# Patient Record
Sex: MALE | Race: WHITE | NOT HISPANIC OR LATINO
[De-identification: names, ages, dates, MRNs, and addresses within clinical notes are randomized per-mention and may not be internally consistent; named-entity substitution may affect disease eponyms.]

---

## 2021-07-19 PROBLEM — Z00.00 ENCOUNTER FOR PREVENTIVE HEALTH EXAMINATION: Status: ACTIVE | Noted: 2021-07-19

## 2021-07-21 ENCOUNTER — APPOINTMENT (OUTPATIENT)
Dept: UROLOGY | Facility: CLINIC | Age: 71
End: 2021-07-21
Payer: MEDICARE

## 2021-07-21 VITALS
HEIGHT: 74 IN | WEIGHT: 200 LBS | BODY MASS INDEX: 25.67 KG/M2 | SYSTOLIC BLOOD PRESSURE: 132 MMHG | TEMPERATURE: 96.5 F | DIASTOLIC BLOOD PRESSURE: 77 MMHG | HEART RATE: 67 BPM

## 2021-07-21 DIAGNOSIS — Z80.49 FAMILY HISTORY OF MALIGNANT NEOPLASM OF OTHER GENITAL ORGANS: ICD-10-CM

## 2021-07-21 DIAGNOSIS — Z80.0 FAMILY HISTORY OF MALIGNANT NEOPLASM OF DIGESTIVE ORGANS: ICD-10-CM

## 2021-07-21 DIAGNOSIS — Z78.9 OTHER SPECIFIED HEALTH STATUS: ICD-10-CM

## 2021-07-21 PROCEDURE — 99204 OFFICE O/P NEW MOD 45 MIN: CPT

## 2021-07-21 RX ORDER — RAMIPRIL 10 MG/1
10 CAPSULE ORAL
Refills: 0 | Status: ACTIVE | COMMUNITY

## 2021-07-21 RX ORDER — AMLODIPINE BESYLATE 2.5 MG/1
2.5 TABLET ORAL
Refills: 0 | Status: ACTIVE | COMMUNITY

## 2021-07-21 RX ORDER — METOPROLOL TARTRATE 25 MG/1
25 TABLET, FILM COATED ORAL
Refills: 0 | Status: ACTIVE | COMMUNITY

## 2021-07-21 RX ORDER — RIVAROXABAN 20 MG/1
20 TABLET, FILM COATED ORAL
Refills: 0 | Status: ACTIVE | COMMUNITY

## 2021-07-21 RX ORDER — SIMVASTATIN 10 MG/1
10 TABLET, FILM COATED ORAL
Refills: 0 | Status: ACTIVE | COMMUNITY

## 2021-08-12 ENCOUNTER — NON-APPOINTMENT (OUTPATIENT)
Age: 71
End: 2021-08-12

## 2021-08-24 ENCOUNTER — APPOINTMENT (OUTPATIENT)
Dept: UROLOGY | Facility: CLINIC | Age: 71
End: 2021-08-24
Payer: MEDICARE

## 2021-08-24 ENCOUNTER — OUTPATIENT (OUTPATIENT)
Dept: OUTPATIENT SERVICES | Facility: HOSPITAL | Age: 71
LOS: 1 days | End: 2021-08-24
Payer: MEDICARE

## 2021-08-24 VITALS
RESPIRATION RATE: 16 BRPM | SYSTOLIC BLOOD PRESSURE: 112 MMHG | HEART RATE: 71 BPM | DIASTOLIC BLOOD PRESSURE: 72 MMHG | TEMPERATURE: 97 F

## 2021-08-24 DIAGNOSIS — R93.5 ABNORMAL FINDINGS ON DIAGNOSTIC IMAGING OF OTHER ABDOMINAL REGIONS, INCLUDING RETROPERITONEUM: ICD-10-CM

## 2021-08-24 DIAGNOSIS — R97.20 ELEVATED PROSTATE, SPECIFIC ANTIGEN [PSA]: ICD-10-CM

## 2021-08-24 DIAGNOSIS — R35.0 FREQUENCY OF MICTURITION: ICD-10-CM

## 2021-08-24 PROCEDURE — 55700: CPT

## 2021-08-24 PROCEDURE — 76872 US TRANSRECTAL: CPT

## 2021-08-24 PROCEDURE — 76377 3D RENDER W/INTRP POSTPROCES: CPT | Mod: 26

## 2021-08-24 PROCEDURE — 76942 ECHO GUIDE FOR BIOPSY: CPT | Mod: 26,59

## 2021-08-24 PROCEDURE — 76872 US TRANSRECTAL: CPT | Mod: 26

## 2021-08-24 PROCEDURE — 55700: CPT | Mod: 22

## 2021-08-24 PROCEDURE — 76942 ECHO GUIDE FOR BIOPSY: CPT | Mod: 59

## 2021-08-25 ENCOUNTER — NON-APPOINTMENT (OUTPATIENT)
Age: 71
End: 2021-08-25

## 2021-08-26 ENCOUNTER — TRANSCRIPTION ENCOUNTER (OUTPATIENT)
Age: 71
End: 2021-08-26

## 2021-08-27 ENCOUNTER — TRANSCRIPTION ENCOUNTER (OUTPATIENT)
Age: 71
End: 2021-08-27

## 2021-08-31 ENCOUNTER — APPOINTMENT (OUTPATIENT)
Dept: UROLOGY | Facility: CLINIC | Age: 71
End: 2021-08-31
Payer: MEDICARE

## 2021-08-31 LAB — CORE LAB BIOPSY: NORMAL

## 2021-08-31 PROCEDURE — 99214 OFFICE O/P EST MOD 30 MIN: CPT | Mod: 95

## 2021-08-31 RX ORDER — DIAZEPAM 5 MG/1
5 TABLET ORAL
Qty: 1 | Refills: 0 | Status: DISCONTINUED | COMMUNITY
Start: 2021-08-13 | End: 2021-08-31

## 2021-08-31 NOTE — END OF VISIT
[Time Spent: ___ minutes] : I have spent [unfilled] minutes of time on the encounter. not appropriate for swallowing intervention

## 2021-08-31 NOTE — LETTER BODY
[FreeTextEntry1] : Bola Collins MD\par [address] [facility]\[place] [address]\par \par Dear Dr. Collins,\par \par John Liu returns today for telemedicine follow-up. He is a 71-year-old man recently status post prostate biopsy. He is with me today by telemedicine for review of these biopsy results. He feels well since the biopsy procedure and denies post biopsy fevers or chills. He has not experienced any difficulty with urination after the procedure.\par \par The biopsy findings have shown Anup 4+3 adenocarcinoma of the prostate. Cancer is detected primarily on the right side of the prostate in both the MRI target lesion as well as several other template biopsies from the overlapping areas of the prostate. Up to 100% core length was involved.\par \par The previously performed MRI of the prostate was also reviewed today demonstrating no evidence of disease outside of the prostate. The neurovascular bundles, lymph nodes, bladder, seminal vesicles, and osseous structures within the pelvis are all within normal limits and do not show any evidence of involvement.\par \par \par In summary, this is a 71-year-old otherwise healthy man with unfavorable intermediate risk prostate cancer. In this context we discussed potential treatment options to consider.\par \par I had a discussion today in the office with the patient regarding potential treatment options for his newly diagnosed prostate cancer. We discussed observation versus radiation therapy versus surgery vs focal therapy.\par \par With regard to active surveillance, I would recommend strongly against this given the clinical significance of his disease detected in his otherwise good health.\par \par With regard to radiation therapy, we discussed the different modalities to deliver the radiation in general. I explained that both seeds and external beam radiation therapy or a combination of the 2 would be options to discuss with the radiation oncologist should he seek their consultation. We also reviewed the potential risks and side effects of radiation therapy including development or worsening lower urinary tract symptoms including urgency and frequency in particular. I also explained to the patient that similar symptoms can occur with bowel movements. The relatively low but increased risk of secondary malignancy was also reviewed for both the bladder and the rectum.  The risk of developing urethral stricture or urinary incontinence was also discussed, and the potential for erectile dysfunction was also reviewed. I also explained to the patient that hormonal therapy may be required in the neoadjuvant fashion prior to initiation of radiation treatment as the outcomes are generally superior and using this approach versus radiation therapy alone. We discussed potential side effects of the hormonal therapy including hot flashes, loss of libido, erectile dysfunction, fatigue, loss of muscle mass, and loss of bone density.\par \par With regard to surgical treatment, we discussed risks and benefits of both open and robotic assisted laparoscopic techniques. I explained that both procedures are performed with the goal of removing the prostate and seminal vesicles in their entirety and that often pelvic lymph node dissection was also performed for the purposes of staging if indicated. I also advised the patient that he would need to avoid any blood thinning medication for one week ahead of time including, but not limited to, Plavix and Coumadin. I discussed the expected hospital course with the patient including a 1-2 night hospital stay and the need for Degroot catheter in place after the procedure. I explained that this would typically remain in place for approximately 7 days. We also reviewed the expected postoperative recovery. Which I informed him would be most bothersome initially with a Degroot catheter and that his energy level will be down for between 3-4 weeks after surgery. I advised him that he would not able to be doing any heavy lifting for 6 weeks following the procedure. We also discussed potential risks and complications of surgery including postoperative bleeding, injury to another intra-abdominal structure, and infection. Significant time was also dedicated to reviewing the potential side effects of surgery including the possibility of urinary incontinence and erectile dysfunction. I reviewed with the patient that almost all patients are initially incontinent and unable to achieve erections, but over time these functions can return to the pre-operative level. I have explained that the urinary control is far more likely to return than erectile function.  We reviewed the importance of performance of kegel exercises after surgery to help regain continence.  I also reviewed several strategies for penile rehabilitation.\par \par With regard to focal therapy of the prostate, we discussed the potential for hemigland ablation. I do think his prostate cancer is somewhat borderline for focal therapy given the volume of disease present but a hemigland ablation could potentially be considered understanding that they would not offer as high likelihood of disease cure but that it could offer him much less significant side effect profile. I do think it is potentially viable to consider here, but I would favor radical prostatectomy or radiation over focal therapy here.\par \par I explained followup strategy for both types of treatments including performance serial PSAs as well as rectal examination.\par \par The patient communicates his understanding of these options as outlined. He is strongly in favor of surgery and would like to move forward with scheduling robotic assisted radical prostatectomy and pelvic lymph node dissection. I have advised him to see you for clearance.\par \par Please do not hesitate to contact me with any questions or concerns.\par \par Sincerely,\par \par \par \par \par John Shields MD, FACS\par  of Urology\par Residency \par [facility]\par \par [facility]\par Director of Robotics and Minimally Invasive Surgery\par [address] [place] [address]\[place] [address]\par P: [phone]\par F: [phone]\par [email or web address]

## 2021-08-31 NOTE — PHYSICAL EXAM
[General Appearance - In No Acute Distress] : no acute distress [] : no respiratory distress [Oriented To Time, Place, And Person] : oriented to person, place, and time [Affect] : the affect was normal [Mood] : the mood was normal [Not Anxious] : not anxious

## 2021-08-31 NOTE — HISTORY OF PRESENT ILLNESS
[Home] : at home, [unfilled] , at the time of the visit. [Medical Office: ([facility])___] : at the medical office located in  [FreeTextEntry1] : See consult letter

## 2021-09-01 ENCOUNTER — TRANSCRIPTION ENCOUNTER (OUTPATIENT)
Age: 71
End: 2021-09-01

## 2021-09-08 DIAGNOSIS — R97.20 ELEVATED PROSTATE SPECIFIC ANTIGEN [PSA]: ICD-10-CM

## 2021-09-08 DIAGNOSIS — R93.5 ABNORMAL FINDINGS ON DIAGNOSTIC IMAGING OF OTHER ABDOMINAL REGIONS, INCLUDING RETROPERITONEUM: ICD-10-CM

## 2021-09-22 ENCOUNTER — TRANSCRIPTION ENCOUNTER (OUTPATIENT)
Age: 71
End: 2021-09-22

## 2021-09-24 ENCOUNTER — NON-APPOINTMENT (OUTPATIENT)
Age: 71
End: 2021-09-24

## 2021-09-24 DIAGNOSIS — Z87.442 PERSONAL HISTORY OF URINARY CALCULI: ICD-10-CM

## 2021-09-24 DIAGNOSIS — R30.0 DYSURIA: ICD-10-CM

## 2021-09-27 ENCOUNTER — TRANSCRIPTION ENCOUNTER (OUTPATIENT)
Age: 71
End: 2021-09-27

## 2021-09-28 ENCOUNTER — TRANSCRIPTION ENCOUNTER (OUTPATIENT)
Age: 71
End: 2021-09-28

## 2021-09-29 ENCOUNTER — OUTPATIENT (OUTPATIENT)
Dept: OUTPATIENT SERVICES | Facility: HOSPITAL | Age: 71
LOS: 1 days | End: 2021-09-29
Payer: MEDICARE

## 2021-09-29 ENCOUNTER — TRANSCRIPTION ENCOUNTER (OUTPATIENT)
Age: 71
End: 2021-09-29

## 2021-09-29 VITALS
WEIGHT: 199.08 LBS | DIASTOLIC BLOOD PRESSURE: 92 MMHG | HEART RATE: 67 BPM | TEMPERATURE: 98 F | SYSTOLIC BLOOD PRESSURE: 134 MMHG | RESPIRATION RATE: 16 BRPM | OXYGEN SATURATION: 97 % | HEIGHT: 73.5 IN

## 2021-09-29 DIAGNOSIS — Z90.49 ACQUIRED ABSENCE OF OTHER SPECIFIED PARTS OF DIGESTIVE TRACT: Chronic | ICD-10-CM

## 2021-09-29 DIAGNOSIS — Z98.890 OTHER SPECIFIED POSTPROCEDURAL STATES: Chronic | ICD-10-CM

## 2021-09-29 DIAGNOSIS — I10 ESSENTIAL (PRIMARY) HYPERTENSION: ICD-10-CM

## 2021-09-29 DIAGNOSIS — C61 MALIGNANT NEOPLASM OF PROSTATE: ICD-10-CM

## 2021-09-29 DIAGNOSIS — Z90.89 ACQUIRED ABSENCE OF OTHER ORGANS: Chronic | ICD-10-CM

## 2021-09-29 DIAGNOSIS — I48.91 UNSPECIFIED ATRIAL FIBRILLATION: ICD-10-CM

## 2021-09-29 DIAGNOSIS — Z87.09 PERSONAL HISTORY OF OTHER DISEASES OF THE RESPIRATORY SYSTEM: ICD-10-CM

## 2021-09-29 LAB
BLD GP AB SCN SERPL QL: NEGATIVE — SIGNIFICANT CHANGE UP
HCT VFR BLD CALC: 46.4 % — SIGNIFICANT CHANGE UP (ref 39–50)
HGB BLD-MCNC: 15.8 G/DL — SIGNIFICANT CHANGE UP (ref 13–17)
MCHC RBC-ENTMCNC: 29.9 PG — SIGNIFICANT CHANGE UP (ref 27–34)
MCHC RBC-ENTMCNC: 34.1 GM/DL — SIGNIFICANT CHANGE UP (ref 32–36)
MCV RBC AUTO: 87.7 FL — SIGNIFICANT CHANGE UP (ref 80–100)
NRBC # BLD: 0 /100 WBCS — SIGNIFICANT CHANGE UP
NRBC # FLD: 0 K/UL — SIGNIFICANT CHANGE UP
PLATELET # BLD AUTO: 220 K/UL — SIGNIFICANT CHANGE UP (ref 150–400)
RBC # BLD: 5.29 M/UL — SIGNIFICANT CHANGE UP (ref 4.2–5.8)
RBC # FLD: 12.5 % — SIGNIFICANT CHANGE UP (ref 10.3–14.5)
RH IG SCN BLD-IMP: POSITIVE — SIGNIFICANT CHANGE UP
WBC # BLD: 7.57 K/UL — SIGNIFICANT CHANGE UP (ref 3.8–10.5)
WBC # FLD AUTO: 7.57 K/UL — SIGNIFICANT CHANGE UP (ref 3.8–10.5)

## 2021-09-29 PROCEDURE — 93010 ELECTROCARDIOGRAM REPORT: CPT

## 2021-09-29 RX ORDER — SODIUM CHLORIDE 9 MG/ML
3 INJECTION INTRAMUSCULAR; INTRAVENOUS; SUBCUTANEOUS EVERY 8 HOURS
Refills: 0 | Status: DISCONTINUED | OUTPATIENT
Start: 2021-10-11 | End: 2021-10-12

## 2021-09-29 NOTE — H&P PST ADULT - NSICDXFAMILYHX_GEN_ALL_CORE_FT
FAMILY HISTORY:  Father  Still living? Unknown  FH: stroke, Age at diagnosis: Age Unknown    Mother  Still living? Unknown  FH: colon cancer, Age at diagnosis: Age Unknown

## 2021-09-29 NOTE — H&P PST ADULT - NSICDXPASTMEDICALHX_GEN_ALL_CORE_FT
PAST MEDICAL HISTORY:  Asthma     Atrial fibrillation     HTN (hypertension)     Hyperlipidemia     Kidney stone     Malignant neoplasm of prostate     Skin cancer (melanoma)     Skin cancer, basal cell     Sleep apnea could not tolerate CPAP    Squamous cell skin cancer      PAST MEDICAL HISTORY:  Asthma     Atrial fibrillation on xarelto    History of left bundle branch block (LBBB)     HTN (hypertension)     Hyperlipidemia     Kidney stone     Malignant neoplasm of prostate     Skin cancer (melanoma)     Skin cancer, basal cell     Sleep apnea could not tolerate CPAP    Squamous cell skin cancer

## 2021-09-29 NOTE — H&P PST ADULT - NSICDXPASTSURGICALHX_GEN_ALL_CORE_FT
PAST SURGICAL HISTORY:  H/O arthroscopy of knee left    H/O lithotripsy     H/O prostate biopsy     History of loop recorder pt states battery  and it is no longer functional    History of prior ablation treatment 2019    History of surgery on arm ORIF on left arm for radial fracture - age 22    S/P appendectomy     S/P cholecystectomy     S/P inguinal hernia repair     S/P tonsillectomy     Status post Mohs surgery

## 2021-09-29 NOTE — H&P PST ADULT - HISTORY OF PRESENT ILLNESS
71 year old male with increasing PSA, had MRI in 7/2021 and  biopsy in 8/2021 which revealed malignancy. Pt also with hx of kidney stones. Pt reports having charlotte hematuria last week which pt states resolved after stopping his Xarelto. Pt states he resumed his Xarelto after the blood cleared up 2 days later but then the hematuria started again so pt stopped his Xarelto again and has not resumed yet.  71 year old male with hx of asthma, afib (on xarelto), LBBB, sleep apnea, HTN, skin cancer with c/o increasing PSA, had MRI in 7/2021 and  biopsy in 8/2021 which revealed malignancy. Pt also with hx of kidney stones. Pt reports having charlotte hematuria last week which pt states resolved after stopping his Xarelto. Pt states he resumed his Xarelto after the blood cleared up 2 days later but then the hematuria started again so pt stopped his Xarelto again and has not resumed yet. Pt presents today for presurgical evaluation for Robotic Radical Prostatectomy Pelvic Lymph Node Dissection.

## 2021-09-29 NOTE — H&P PST ADULT - PROBLEM SELECTOR PLAN 1
Pt scheduled for surgery on 10/11/2021.  Pre-op instructions provided. Pt verbalized understanding.   Pepcid provided for GI prophylaxis.   Pt given detailed verbal and written instructions on chlorhexidine wash. Pt verbalized understanding with teachback.   Pt instructed to f/u with surgeon regarding preop COVID testing.

## 2021-10-06 ENCOUNTER — NON-APPOINTMENT (OUTPATIENT)
Age: 71
End: 2021-10-06

## 2021-10-10 ENCOUNTER — TRANSCRIPTION ENCOUNTER (OUTPATIENT)
Age: 71
End: 2021-10-10

## 2021-10-10 LAB — SARS-COV-2 N GENE NPH QL NAA+PROBE: NOT DETECTED

## 2021-10-11 ENCOUNTER — RESULT REVIEW (OUTPATIENT)
Age: 71
End: 2021-10-11

## 2021-10-11 ENCOUNTER — APPOINTMENT (OUTPATIENT)
Dept: UROLOGY | Facility: HOSPITAL | Age: 71
End: 2021-10-11

## 2021-10-11 ENCOUNTER — INPATIENT (INPATIENT)
Facility: HOSPITAL | Age: 71
LOS: 0 days | Discharge: HOME | End: 2021-10-12
Attending: UROLOGY | Admitting: UROLOGY
Payer: MEDICARE

## 2021-10-11 VITALS
WEIGHT: 199.08 LBS | HEART RATE: 18 BPM | DIASTOLIC BLOOD PRESSURE: 81 MMHG | HEIGHT: 73.5 IN | RESPIRATION RATE: 96 BRPM | OXYGEN SATURATION: 96 % | SYSTOLIC BLOOD PRESSURE: 139 MMHG | TEMPERATURE: 99 F

## 2021-10-11 DIAGNOSIS — Z98.890 OTHER SPECIFIED POSTPROCEDURAL STATES: Chronic | ICD-10-CM

## 2021-10-11 DIAGNOSIS — Z90.89 ACQUIRED ABSENCE OF OTHER ORGANS: Chronic | ICD-10-CM

## 2021-10-11 DIAGNOSIS — G47.33 OBSTRUCTIVE SLEEP APNEA (ADULT) (PEDIATRIC): ICD-10-CM

## 2021-10-11 DIAGNOSIS — Z90.49 ACQUIRED ABSENCE OF OTHER SPECIFIED PARTS OF DIGESTIVE TRACT: Chronic | ICD-10-CM

## 2021-10-11 DIAGNOSIS — C61 MALIGNANT NEOPLASM OF PROSTATE: ICD-10-CM

## 2021-10-11 PROCEDURE — 88309 TISSUE EXAM BY PATHOLOGIST: CPT | Mod: 26

## 2021-10-11 PROCEDURE — 88307 TISSUE EXAM BY PATHOLOGIST: CPT | Mod: 26

## 2021-10-11 PROCEDURE — 38571 LAPAROSCOPY LYMPHADENECTOMY: CPT

## 2021-10-11 PROCEDURE — 55866 LAPS SURG PRST8ECT RPBIC RAD: CPT

## 2021-10-11 RX ORDER — ALBUTEROL 90 UG/1
2 AEROSOL, METERED ORAL
Qty: 0 | Refills: 0 | DISCHARGE

## 2021-10-11 RX ORDER — ACETAMINOPHEN 500 MG
975 TABLET ORAL EVERY 6 HOURS
Refills: 0 | Status: DISCONTINUED | OUTPATIENT
Start: 2021-10-11 | End: 2021-10-12

## 2021-10-11 RX ORDER — HYDROMORPHONE HYDROCHLORIDE 2 MG/ML
0.5 INJECTION INTRAMUSCULAR; INTRAVENOUS; SUBCUTANEOUS
Refills: 0 | Status: DISCONTINUED | OUTPATIENT
Start: 2021-10-11 | End: 2021-10-12

## 2021-10-11 RX ORDER — KETOROLAC TROMETHAMINE 30 MG/ML
15 SYRINGE (ML) INJECTION EVERY 6 HOURS
Refills: 0 | Status: DISCONTINUED | OUTPATIENT
Start: 2021-10-11 | End: 2021-10-12

## 2021-10-11 RX ORDER — SODIUM CHLORIDE 9 MG/ML
1000 INJECTION, SOLUTION INTRAVENOUS
Refills: 0 | Status: DISCONTINUED | OUTPATIENT
Start: 2021-10-11 | End: 2021-10-11

## 2021-10-11 RX ORDER — ONDANSETRON 8 MG/1
4 TABLET, FILM COATED ORAL EVERY 6 HOURS
Refills: 0 | Status: DISCONTINUED | OUTPATIENT
Start: 2021-10-11 | End: 2021-10-12

## 2021-10-11 RX ORDER — METOPROLOL TARTRATE 50 MG
1 TABLET ORAL
Qty: 0 | Refills: 0 | DISCHARGE

## 2021-10-11 RX ORDER — RAMIPRIL 5 MG
1 CAPSULE ORAL
Qty: 0 | Refills: 0 | DISCHARGE

## 2021-10-11 RX ORDER — METOPROLOL TARTRATE 50 MG
25 TABLET ORAL DAILY
Refills: 0 | Status: DISCONTINUED | OUTPATIENT
Start: 2021-10-11 | End: 2021-10-12

## 2021-10-11 RX ORDER — SENNA PLUS 8.6 MG/1
2 TABLET ORAL AT BEDTIME
Refills: 0 | Status: DISCONTINUED | OUTPATIENT
Start: 2021-10-11 | End: 2021-10-12

## 2021-10-11 RX ORDER — AMLODIPINE BESYLATE 2.5 MG/1
2.5 TABLET ORAL DAILY
Refills: 0 | Status: DISCONTINUED | OUTPATIENT
Start: 2021-10-11 | End: 2021-10-12

## 2021-10-11 RX ORDER — SIMVASTATIN 20 MG/1
1 TABLET, FILM COATED ORAL
Qty: 0 | Refills: 0 | DISCHARGE

## 2021-10-11 RX ORDER — HEPARIN SODIUM 5000 [USP'U]/ML
5000 INJECTION INTRAVENOUS; SUBCUTANEOUS EVERY 8 HOURS
Refills: 0 | Status: DISCONTINUED | OUTPATIENT
Start: 2021-10-11 | End: 2021-10-12

## 2021-10-11 RX ORDER — SIMVASTATIN 20 MG/1
10 TABLET, FILM COATED ORAL AT BEDTIME
Refills: 0 | Status: DISCONTINUED | OUTPATIENT
Start: 2021-10-11 | End: 2021-10-12

## 2021-10-11 RX ORDER — LISINOPRIL 2.5 MG/1
40 TABLET ORAL DAILY
Refills: 0 | Status: DISCONTINUED | OUTPATIENT
Start: 2021-10-11 | End: 2021-10-12

## 2021-10-11 RX ORDER — SODIUM CHLORIDE 9 MG/ML
1000 INJECTION, SOLUTION INTRAVENOUS
Refills: 0 | Status: DISCONTINUED | OUTPATIENT
Start: 2021-10-11 | End: 2021-10-12

## 2021-10-11 RX ORDER — AMLODIPINE BESYLATE 2.5 MG/1
1 TABLET ORAL
Qty: 0 | Refills: 0 | DISCHARGE

## 2021-10-11 RX ORDER — ONDANSETRON 8 MG/1
4 TABLET, FILM COATED ORAL ONCE
Refills: 0 | Status: DISCONTINUED | OUTPATIENT
Start: 2021-10-11 | End: 2021-10-12

## 2021-10-11 RX ORDER — ALBUTEROL 90 UG/1
2 AEROSOL, METERED ORAL EVERY 6 HOURS
Refills: 0 | Status: DISCONTINUED | OUTPATIENT
Start: 2021-10-11 | End: 2021-10-12

## 2021-10-11 RX ADMIN — HEPARIN SODIUM 5000 UNIT(S): 5000 INJECTION INTRAVENOUS; SUBCUTANEOUS at 14:55

## 2021-10-11 RX ADMIN — Medication 975 MILLIGRAM(S): at 17:21

## 2021-10-11 RX ADMIN — HEPARIN SODIUM 5000 UNIT(S): 5000 INJECTION INTRAVENOUS; SUBCUTANEOUS at 21:53

## 2021-10-11 RX ADMIN — SODIUM CHLORIDE 125 MILLILITER(S): 9 INJECTION, SOLUTION INTRAVENOUS at 17:24

## 2021-10-11 RX ADMIN — SODIUM CHLORIDE 30 MILLILITER(S): 9 INJECTION, SOLUTION INTRAVENOUS at 06:44

## 2021-10-11 RX ADMIN — SODIUM CHLORIDE 3 MILLILITER(S): 9 INJECTION INTRAMUSCULAR; INTRAVENOUS; SUBCUTANEOUS at 13:24

## 2021-10-11 RX ADMIN — SODIUM CHLORIDE 3 MILLILITER(S): 9 INJECTION INTRAMUSCULAR; INTRAVENOUS; SUBCUTANEOUS at 22:00

## 2021-10-11 RX ADMIN — SODIUM CHLORIDE 125 MILLILITER(S): 9 INJECTION, SOLUTION INTRAVENOUS at 21:52

## 2021-10-11 RX ADMIN — HYDROMORPHONE HYDROCHLORIDE 0.5 MILLIGRAM(S): 2 INJECTION INTRAMUSCULAR; INTRAVENOUS; SUBCUTANEOUS at 13:00

## 2021-10-11 RX ADMIN — HYDROMORPHONE HYDROCHLORIDE 0.5 MILLIGRAM(S): 2 INJECTION INTRAMUSCULAR; INTRAVENOUS; SUBCUTANEOUS at 12:47

## 2021-10-11 RX ADMIN — Medication 15 MILLIGRAM(S): at 17:22

## 2021-10-11 RX ADMIN — SIMVASTATIN 10 MILLIGRAM(S): 20 TABLET, FILM COATED ORAL at 21:53

## 2021-10-11 NOTE — BRIEF OPERATIVE NOTE - NSICDXBRIEFPROCEDURE_GEN_ALL_CORE_FT
PROCEDURES:  Robotic-assisted prostatectomy with pelvic lymph node dissection 11-Oct-2021 12:24:10  Flaquito Smiley

## 2021-10-11 NOTE — PROGRESS NOTE ADULT - SUBJECTIVE AND OBJECTIVE BOX
Post op check    This 72 yo M is s/p RALP/LND  PMH: asthma; A-fib; LBBB; HTN  Pt is awake and alert  Has no c/o  Afeb 113/74 70 97%RA    Abd- soft; appropriately tender             wounds C&D  Degroot 700  CARLITO 175

## 2021-10-11 NOTE — ASU PREOP CHECKLIST - SIDE RAILS UP
n/a I personally performed the service described in the documentation recorded by the scribe in my presence, and it accurately and completely records my words and actions.

## 2021-10-12 ENCOUNTER — TRANSCRIPTION ENCOUNTER (OUTPATIENT)
Age: 71
End: 2021-10-12

## 2021-10-12 VITALS
OXYGEN SATURATION: 96 % | SYSTOLIC BLOOD PRESSURE: 125 MMHG | RESPIRATION RATE: 18 BRPM | DIASTOLIC BLOOD PRESSURE: 65 MMHG | HEART RATE: 68 BPM | TEMPERATURE: 98 F

## 2021-10-12 DIAGNOSIS — D72.829 ELEVATED WHITE BLOOD CELL COUNT, UNSPECIFIED: ICD-10-CM

## 2021-10-12 DIAGNOSIS — D62 ACUTE POSTHEMORRHAGIC ANEMIA: ICD-10-CM

## 2021-10-12 DIAGNOSIS — Z29.9 ENCOUNTER FOR PROPHYLACTIC MEASURES, UNSPECIFIED: ICD-10-CM

## 2021-10-12 LAB
ANION GAP SERPL CALC-SCNC: 13 MMOL/L — SIGNIFICANT CHANGE UP (ref 7–14)
BASOPHILS # BLD AUTO: 0.01 K/UL — SIGNIFICANT CHANGE UP (ref 0–0.2)
BASOPHILS NFR BLD AUTO: 0.1 % — SIGNIFICANT CHANGE UP (ref 0–2)
BUN SERPL-MCNC: 18 MG/DL — SIGNIFICANT CHANGE UP (ref 7–23)
CALCIUM SERPL-MCNC: 8.4 MG/DL — SIGNIFICANT CHANGE UP (ref 8.4–10.5)
CHLORIDE SERPL-SCNC: 103 MMOL/L — SIGNIFICANT CHANGE UP (ref 98–107)
CO2 SERPL-SCNC: 22 MMOL/L — SIGNIFICANT CHANGE UP (ref 22–31)
COVID-19 SPIKE DOMAIN AB INTERP: POSITIVE
COVID-19 SPIKE DOMAIN ANTIBODY RESULT: 201 U/ML — HIGH
CREAT FLD-MCNC: 1.17 MG/DL — SIGNIFICANT CHANGE UP
CREAT SERPL-MCNC: 1.11 MG/DL — SIGNIFICANT CHANGE UP (ref 0.5–1.3)
EOSINOPHIL # BLD AUTO: 0 K/UL — SIGNIFICANT CHANGE UP (ref 0–0.5)
EOSINOPHIL NFR BLD AUTO: 0 % — SIGNIFICANT CHANGE UP (ref 0–6)
GLUCOSE SERPL-MCNC: 98 MG/DL — SIGNIFICANT CHANGE UP (ref 70–99)
HCT VFR BLD CALC: 37 % — LOW (ref 39–50)
HGB BLD-MCNC: 12.7 G/DL — LOW (ref 13–17)
IANC: 10.68 K/UL — HIGH (ref 1.5–8.5)
IMM GRANULOCYTES NFR BLD AUTO: 0.6 % — SIGNIFICANT CHANGE UP (ref 0–1.5)
LYMPHOCYTES # BLD AUTO: 1.3 K/UL — SIGNIFICANT CHANGE UP (ref 1–3.3)
LYMPHOCYTES # BLD AUTO: 9.9 % — LOW (ref 13–44)
MCHC RBC-ENTMCNC: 30.3 PG — SIGNIFICANT CHANGE UP (ref 27–34)
MCHC RBC-ENTMCNC: 34.3 GM/DL — SIGNIFICANT CHANGE UP (ref 32–36)
MCV RBC AUTO: 88.3 FL — SIGNIFICANT CHANGE UP (ref 80–100)
MONOCYTES # BLD AUTO: 1.11 K/UL — HIGH (ref 0–0.9)
MONOCYTES NFR BLD AUTO: 8.4 % — SIGNIFICANT CHANGE UP (ref 2–14)
NEUTROPHILS # BLD AUTO: 10.68 K/UL — HIGH (ref 1.8–7.4)
NEUTROPHILS NFR BLD AUTO: 81 % — HIGH (ref 43–77)
NRBC # BLD: 0 /100 WBCS — SIGNIFICANT CHANGE UP
NRBC # FLD: 0 K/UL — SIGNIFICANT CHANGE UP
PLATELET # BLD AUTO: 182 K/UL — SIGNIFICANT CHANGE UP (ref 150–400)
POTASSIUM SERPL-MCNC: 3.9 MMOL/L — SIGNIFICANT CHANGE UP (ref 3.5–5.3)
POTASSIUM SERPL-SCNC: 3.9 MMOL/L — SIGNIFICANT CHANGE UP (ref 3.5–5.3)
RBC # BLD: 4.19 M/UL — LOW (ref 4.2–5.8)
RBC # FLD: 12.8 % — SIGNIFICANT CHANGE UP (ref 10.3–14.5)
SARS-COV-2 IGG+IGM SERPL QL IA: 201 U/ML — HIGH
SARS-COV-2 IGG+IGM SERPL QL IA: POSITIVE
SODIUM SERPL-SCNC: 138 MMOL/L — SIGNIFICANT CHANGE UP (ref 135–145)
WBC # BLD: 13.18 K/UL — HIGH (ref 3.8–10.5)
WBC # FLD AUTO: 13.18 K/UL — HIGH (ref 3.8–10.5)

## 2021-10-12 PROCEDURE — 99223 1ST HOSP IP/OBS HIGH 75: CPT

## 2021-10-12 RX ORDER — SODIUM CHLORIDE 9 MG/ML
1000 INJECTION, SOLUTION INTRAVENOUS
Refills: 0 | Status: DISCONTINUED | OUTPATIENT
Start: 2021-10-12 | End: 2021-10-12

## 2021-10-12 RX ORDER — ACETAMINOPHEN 500 MG
3 TABLET ORAL
Qty: 0 | Refills: 0 | DISCHARGE
Start: 2021-10-12

## 2021-10-12 RX ORDER — RIVAROXABAN 15 MG-20MG
1 KIT ORAL
Qty: 0 | Refills: 0 | DISCHARGE

## 2021-10-12 RX ADMIN — AMLODIPINE BESYLATE 2.5 MILLIGRAM(S): 2.5 TABLET ORAL at 05:53

## 2021-10-12 RX ADMIN — Medication 15 MILLIGRAM(S): at 11:49

## 2021-10-12 RX ADMIN — Medication 25 MILLIGRAM(S): at 05:53

## 2021-10-12 RX ADMIN — HEPARIN SODIUM 5000 UNIT(S): 5000 INJECTION INTRAVENOUS; SUBCUTANEOUS at 13:02

## 2021-10-12 RX ADMIN — Medication 975 MILLIGRAM(S): at 11:49

## 2021-10-12 RX ADMIN — Medication 15 MILLIGRAM(S): at 05:52

## 2021-10-12 RX ADMIN — Medication 975 MILLIGRAM(S): at 12:05

## 2021-10-12 RX ADMIN — Medication 15 MILLIGRAM(S): at 12:39

## 2021-10-12 RX ADMIN — LISINOPRIL 40 MILLIGRAM(S): 2.5 TABLET ORAL at 05:53

## 2021-10-12 RX ADMIN — SODIUM CHLORIDE 3 MILLILITER(S): 9 INJECTION INTRAMUSCULAR; INTRAVENOUS; SUBCUTANEOUS at 11:41

## 2021-10-12 RX ADMIN — SODIUM CHLORIDE 3 MILLILITER(S): 9 INJECTION INTRAMUSCULAR; INTRAVENOUS; SUBCUTANEOUS at 06:35

## 2021-10-12 RX ADMIN — Medication 15 MILLIGRAM(S): at 00:33

## 2021-10-12 RX ADMIN — Medication 975 MILLIGRAM(S): at 00:33

## 2021-10-12 RX ADMIN — Medication 975 MILLIGRAM(S): at 05:52

## 2021-10-12 RX ADMIN — HEPARIN SODIUM 5000 UNIT(S): 5000 INJECTION INTRAVENOUS; SUBCUTANEOUS at 05:53

## 2021-10-12 NOTE — DISCHARGE NOTE PROVIDER - NSDCMRMEDTOKEN_GEN_ALL_CORE_FT
acetaminophen 325 mg oral tablet: 3 tab(s) orally every 6 hours; may alternate with motrin 600 mg every 6 hrs; so every 3 hrs you take one med, then the other.  amLODIPine 2.5 mg oral tablet: 1 tab(s) orally once a day  metoprolol succinate 25 mg oral tablet, extended release: 1 tab(s) orally once a day  ProAir HFA 90 mcg/inh inhalation aerosol: 2 puff(s) inhaled every 6 hours, As Needed  ramipril 10 mg oral capsule: 1 cap(s) orally once a day  simvastatin 10 mg oral tablet: 1 tab(s) orally once a day

## 2021-10-12 NOTE — DISCHARGE NOTE PROVIDER - HOSPITAL COURSE
Pt had RALP/LND yesterday; did well; ambulating; labs stable; CARLITO removed; pain is controlled; no gas yet but nelson liquids; home with holbrook, self-advancing diet.; f/u next week.

## 2021-10-12 NOTE — DISCHARGE NOTE PROVIDER - NSDCCPCAREPLAN_GEN_ALL_CORE_FT
PRINCIPAL DISCHARGE DIAGNOSIS  Diagnosis: Malignant neoplasm of prostate  Assessment and Plan of Treatment: Drink plenty of fluids.  No heavy lifting (greater than 10 pounds) or straining for 4 to 6 weeks.  You may shower, just pat white strips dry, they will fall off in a few weeks. Change dressing at drain site daily or as needed until dry. Degroot care as instructed.  You may re-start Xarelto in 7 days   's  office will   to schedule a follow up appointment.  Call the office if you have fever greater than 101, pain not relieved with pain medication, nausea/vomiting..

## 2021-10-12 NOTE — DISCHARGE NOTE PROVIDER - CARE PROVIDER_API CALL
John Shields)  Urology  [address] [place] [address], [place] [address]  Phone: [phone]  Fax: [phone]  Follow Up Time:

## 2021-10-12 NOTE — DISCHARGE NOTE NURSING/CASE MANAGEMENT/SOCIAL WORK - NSDCPNINST_GEN_ALL_CORE
Notify Dr Shields if you experience any increase in pain not relieved with medication, any redness, drainage or swelling around incision, any persistent nausea vomiting or if you develop a fever >100.5.  Drink plenty of fluids.  No heavy lifting or straining. Degroot care as instructed, use leg bag during the day and large drainage bag at night.  Use over the counter stool softeners to assist with constipation.

## 2021-10-12 NOTE — CONSULT NOTE ADULT - ASSESSMENT
71 year old male with hx of asthma, afib (on Xarelto), sleep apnea not on CPAP, HTN, skin cancer, prostate CA, admitted for RALP+LND

## 2021-10-12 NOTE — DISCHARGE NOTE NURSING/CASE MANAGEMENT/SOCIAL WORK - NSDPDISTO_GEN_ALL_CORE
stable, abdominal lap sites, clean dry and intact, tolerating diet, holbrook draining adequately. oob with assist/Home

## 2021-10-12 NOTE — PROGRESS NOTE ADULT - SUBJECTIVE AND OBJECTIVE BOX
POD #1  Afeb 106/56 71 94%RA    Pt has no c/o; feels well  Abd- soft NT ND; no flatus    wounds C&D  Zane 850  CARLITO 95

## 2021-10-12 NOTE — PROGRESS NOTE ADULT - ASSESSMENT
stable s/p RALP/LND  Plan:  - OOB  - AM labs  - clears until flatus
stable s/p RALP/LND  POD#1 - doing well; no gas yet  Plan:  - OOB  - AM labs  - clears until flatus  - CARLITO creat

## 2021-10-12 NOTE — CONSULT NOTE ADULT - SUBJECTIVE AND OBJECTIVE BOX
HPI:  71 year old male with hx of asthma, afib (on Xarelto), sleep apnea not on CPAP, HTN, skin cancer, prostate CA, admitted for robotic radical prostatectomy pelvic lymph node dissection. Patient w/ increasing PSA, had MRI in 2021 and  biopsy in 2021 which revealed malignancy. Pt reports having charlotte hematuria last month which pt states resolved after stopping his Xarelto. Pt states he resumed his Xarelto after the blood cleared up 2 days later but then the hematuria started again so pt stopped his Xarelto again. Pt underwent RALP+LND on . He is currently not complaining of pain. He is not passing gas yet. Denies nausea/vomiting. Medicine consulted for co-management of Afib and HTN.       PAST MEDICAL & SURGICAL HISTORY:   Atrial fibrillation  on xarelto    Hyperlipidemia    HTN (hypertension)    Asthma    Malignant neoplasm of prostate    Sleep apnea  could not tolerate CPAP    Kidney stone    Skin cancer (melanoma)    Skin cancer, basal cell    Squamous cell skin cancer    History of left bundle branch block (LBBB)    History of loop recorder  pt states battery  and it is no longer functional    History of prior ablation treatment      S/P appendectomy    S/P tonsillectomy    S/P cholecystectomy    S/P inguinal hernia repair    H/O arthroscopy of knee  left    History of surgery on arm  ORIF on left arm for radial fracture - age 22    H/O prostate biopsy    H/O lithotripsy    Status post Mohs surgery        Review of Systems:   CONSTITUTIONAL: No fever, weight loss, or fatigue  EYES: No eye pain, visual disturbances, or discharge  ENMT:  No difficulty hearing, tinnitus, vertigo; No sinus or throat pain  NECK: No pain or stiffness  BREASTS: No pain, masses, or nipple discharge  RESPIRATORY: No cough, wheezing, chills or hemoptysis; No shortness of breath  CARDIOVASCULAR: No chest pain, palpitations, dizziness, or leg swelling  GASTROINTESTINAL: No abdominal or epigastric pain. No nausea, vomiting, or hematemesis; No diarrhea or constipation. No melena or hematochezia.  GENITOURINARY: No dysuria, frequency, or incontinence. +Hematuria   NEUROLOGICAL: No headaches, memory loss, loss of strength, numbness, or tremors  SKIN: No itching, burning, rashes, or lesions   LYMPH NODES: No enlarged glands  ENDOCRINE: No heat or cold intolerance; No hair loss  MUSCULOSKELETAL: No joint pain or swelling; No muscle, back, or extremity pain  PSYCHIATRIC: No depression, anxiety, mood swings, or difficulty sleeping  HEME/LYMPH: No easy bruising, or bleeding gums  ALLERY AND IMMUNOLOGIC: No hives or eczema    Allergies    penicillins (Hives)    Intolerances    Social History:   No smoking   Drinks 1-2 glasses of wine daily     FAMILY HISTORY:  FH: stroke (Father)  FH: colon cancer (Mother)      MEDICATIONS  (STANDING):  acetaminophen   Tablet .. 975 milliGRAM(s) Oral every 6 hours  amLODIPine   Tablet 2.5 milliGRAM(s) Oral daily  dextrose 5% + sodium chloride 0.45%. 1000 milliLiter(s) (75 mL/Hr) IV Continuous <Continuous>  heparin   Injectable 5000 Unit(s) SubCutaneous every 8 hours  ketorolac   Injectable 15 milliGRAM(s) IV Push every 6 hours  lisinopril 40 milliGRAM(s) Oral daily  metoprolol succinate ER 25 milliGRAM(s) Oral daily  simvastatin 10 milliGRAM(s) Oral at bedtime  sodium chloride 0.9% lock flush 3 milliLiter(s) IV Push every 8 hours    MEDICATIONS  (PRN):  ALBUTerol    90 MICROgram(s) HFA Inhaler 2 Puff(s) Inhalation every 6 hours PRN Shortness of Breath and/or Wheezing  HYDROmorphone  Injectable 0.5 milliGRAM(s) IV Push every 10 minutes PRN Severe Pain (7 - 10)  ondansetron Injectable 4 milliGRAM(s) IV Push every 6 hours PRN Nausea and/or Vomiting  ondansetron Injectable 4 milliGRAM(s) IV Push once PRN Nausea and/or Vomiting  senna 2 Tablet(s) Oral at bedtime PRN Constipation      Vital Signs Last 24 Hrs  T(C): 36.8 (12 Oct 2021 05:48), Max: 37.1 (11 Oct 2021 22:00)  T(F): 98.3 (12 Oct 2021 05:48), Max: 98.7 (11 Oct 2021 22:00)  HR: 72 (12 Oct 2021 05:48) (64 - 82)  BP: 111/62 (12 Oct 2021 05:48) (106/56 - 126/77)  BP(mean): 82 (11 Oct 2021 15:00) (72 - 87)  RR: 19 (12 Oct 2021 05:48) (14 - 20)  SpO2: 94% (12 Oct 2021 05:48) (94% - 98%)  CAPILLARY BLOOD GLUCOSE        I&O's Summary    11 Oct 2021 07:01  -  12 Oct 2021 07:00  --------------------------------------------------------  IN: 370 mL / OUT: 3150 mL / NET: -2780 mL    12 Oct 2021 07:01  -  12 Oct 2021 11:42  --------------------------------------------------------  IN: 0 mL / OUT: 590 mL / NET: -590 mL        PHYSICAL EXAM:  GENERAL: NAD, well-developed  EYES: EOMI, PERRLA, conjunctiva and sclera clear  NECK: Supple, No JVD  CHEST/LUNG: Clear to auscultation bilaterally; No wheeze  HEART: Regular rate and rhythm; No murmurs, rubs, or gallops  ABDOMEN: Soft, Nontender, Nondistended; Bowel sounds present  : Degroot w/ yellow urine   EXTREMITIES:  2+ Peripheral Pulses, No clubbing, cyanosis, or edema  PSYCH: AAOx3  NEUROLOGY: non-focal  SKIN: No rashes or lesions    LABS:                        12.7   13.18 )-----------( 182      ( 12 Oct 2021 07:56 )             37.0     10-12    138  |  103  |  18  ----------------------------<  98  3.9   |  22  |  1.11    Ca    8.4      12 Oct 2021 07:56                RADIOLOGY & ADDITIONAL TESTS:    Imaging Personally Reviewed:  EKG from 21 reviewed by me: ANA HINES       Consultant(s) Notes Reviewed:      Care Discussed with Consultants/Other Providers: Spoke w/ urology PA, restart Xarelto once deemed appropriate    HPI:  71 year old male with hx of asthma, afib (on Xarelto), sleep apnea not on CPAP, HTN, skin cancer, prostate CA, admitted for robotic radical prostatectomy pelvic lymph node dissection. Patient w/ increasing PSA, had MRI in 2021 and  biopsy in 2021 which revealed malignancy. Pt reports having charlotte hematuria last month which pt states resolved after stopping his Xarelto. Pt states he resumed his Xarelto after the blood cleared up 2 days later but then the hematuria started again so pt stopped his Xarelto again. Pt underwent RALP+LND on . He is currently not complaining of pain. He is not passing gas yet. Denies nausea/vomiting. Medicine consulted for co-management of Afib and HTN.       PAST MEDICAL & SURGICAL HISTORY:   Atrial fibrillation  on xarelto    Hyperlipidemia    HTN (hypertension)    Asthma    Malignant neoplasm of prostate    Sleep apnea  could not tolerate CPAP    Kidney stone    Skin cancer (melanoma)    Skin cancer, basal cell    Squamous cell skin cancer    History of left bundle branch block (LBBB)    History of loop recorder  pt states battery  and it is no longer functional    History of prior ablation treatment      S/P appendectomy    S/P tonsillectomy    S/P cholecystectomy    S/P inguinal hernia repair    H/O arthroscopy of knee  left    History of surgery on arm  ORIF on left arm for radial fracture - age 22    H/O prostate biopsy    H/O lithotripsy    Status post Mohs surgery        Review of Systems:   CONSTITUTIONAL: No fever, weight loss, or fatigue  EYES: No eye pain, visual disturbances, or discharge  ENMT:  No difficulty hearing, tinnitus, vertigo; No sinus or throat pain  NECK: No pain or stiffness  BREASTS: No pain, masses, or nipple discharge  RESPIRATORY: No cough, wheezing, chills or hemoptysis; No shortness of breath  CARDIOVASCULAR: No chest pain, palpitations, dizziness, or leg swelling  GASTROINTESTINAL: No abdominal or epigastric pain. No nausea, vomiting, or hematemesis; No diarrhea or constipation. No melena or hematochezia.  GENITOURINARY: No dysuria, frequency, or incontinence. +Hematuria   NEUROLOGICAL: No headaches, memory loss, loss of strength, numbness, or tremors  SKIN: No itching, burning, rashes, or lesions   LYMPH NODES: No enlarged glands  ENDOCRINE: No heat or cold intolerance; No hair loss  MUSCULOSKELETAL: No joint pain or swelling; No muscle, back, or extremity pain  PSYCHIATRIC: No depression, anxiety, mood swings, or difficulty sleeping  HEME/LYMPH: No easy bruising, or bleeding gums  ALLERY AND IMMUNOLOGIC: No hives or eczema    Allergies    penicillins (Hives)    Intolerances    Social History:   No smoking   Drinks 1-2 glasses of wine daily     FAMILY HISTORY:  FH: stroke (Father)  FH: colon cancer (Mother)      MEDICATIONS  (STANDING):  acetaminophen   Tablet .. 975 milliGRAM(s) Oral every 6 hours  amLODIPine   Tablet 2.5 milliGRAM(s) Oral daily  dextrose 5% + sodium chloride 0.45%. 1000 milliLiter(s) (75 mL/Hr) IV Continuous <Continuous>  heparin   Injectable 5000 Unit(s) SubCutaneous every 8 hours  ketorolac   Injectable 15 milliGRAM(s) IV Push every 6 hours  lisinopril 40 milliGRAM(s) Oral daily  metoprolol succinate ER 25 milliGRAM(s) Oral daily  simvastatin 10 milliGRAM(s) Oral at bedtime  sodium chloride 0.9% lock flush 3 milliLiter(s) IV Push every 8 hours    MEDICATIONS  (PRN):  ALBUTerol    90 MICROgram(s) HFA Inhaler 2 Puff(s) Inhalation every 6 hours PRN Shortness of Breath and/or Wheezing  HYDROmorphone  Injectable 0.5 milliGRAM(s) IV Push every 10 minutes PRN Severe Pain (7 - 10)  ondansetron Injectable 4 milliGRAM(s) IV Push every 6 hours PRN Nausea and/or Vomiting  ondansetron Injectable 4 milliGRAM(s) IV Push once PRN Nausea and/or Vomiting  senna 2 Tablet(s) Oral at bedtime PRN Constipation      Vital Signs Last 24 Hrs  T(C): 36.8 (12 Oct 2021 05:48), Max: 37.1 (11 Oct 2021 22:00)  T(F): 98.3 (12 Oct 2021 05:48), Max: 98.7 (11 Oct 2021 22:00)  HR: 72 (12 Oct 2021 05:48) (64 - 82)  BP: 111/62 (12 Oct 2021 05:48) (106/56 - 126/77)  BP(mean): 82 (11 Oct 2021 15:00) (72 - 87)  RR: 19 (12 Oct 2021 05:48) (14 - 20)  SpO2: 94% (12 Oct 2021 05:48) (94% - 98%)  CAPILLARY BLOOD GLUCOSE        I&O's Summary    11 Oct 2021 07:01  -  12 Oct 2021 07:00  --------------------------------------------------------  IN: 370 mL / OUT: 3150 mL / NET: -2780 mL    12 Oct 2021 07:01  -  12 Oct 2021 11:42  --------------------------------------------------------  IN: 0 mL / OUT: 590 mL / NET: -590 mL        PHYSICAL EXAM:  GENERAL: NAD, well-developed  EYES: EOMI, PERRLA, conjunctiva and sclera clear  NECK: Supple, No JVD  CHEST/LUNG: Clear to auscultation bilaterally; No wheeze  HEART: Regular rate and rhythm; No murmurs, rubs, or gallops  ABDOMEN: Soft, Nontender, Nondistended; Bowel sounds present, +CARLITO drain   : Degroot w/ yellow urine   EXTREMITIES:  2+ Peripheral Pulses, No clubbing, cyanosis, or edema  PSYCH: AAOx3  NEUROLOGY: non-focal  SKIN: No rashes or lesions    LABS:                        12.7   13.18 )-----------( 182      ( 12 Oct 2021 07:56 )             37.0     10-12    138  |  103  |  18  ----------------------------<  98  3.9   |  22  |  1.11    Ca    8.4      12 Oct 2021 07:56                RADIOLOGY & ADDITIONAL TESTS:    Imaging Personally Reviewed:  EKG from 21 reviewed by me: DONNY, ANA       Consultant(s) Notes Reviewed:      Care Discussed with Consultants/Other Providers: Spoke w/ urology PA, restart Xarelto once deemed appropriate

## 2021-10-12 NOTE — DISCHARGE NOTE NURSING/CASE MANAGEMENT/SOCIAL WORK - NSDCPECAREGIVERED_GEN_ALL_CORE
carenotes on RALP, pain after surgery, [facility] carenotes on lap prostatectomy, pain management after surgery, [facility] carenotes, carenotes on d/c medication

## 2021-10-12 NOTE — CONSULT NOTE ADULT - PROBLEM SELECTOR RECOMMENDATION 9
s/p RALP+LND on 11/11/21   Pain control   Management of CARLITO drain as per    Degroot as per    CLD, advance diet as tolerated s/p RALP+LND on 10/11/21   Pain control   Management of CARLITO drain as per    Degroot as per    CLD, advance diet as tolerated

## 2021-10-13 PROBLEM — C43.9 MALIGNANT MELANOMA OF SKIN, UNSPECIFIED: Chronic | Status: ACTIVE | Noted: 2021-09-29

## 2021-10-13 PROBLEM — I10 ESSENTIAL (PRIMARY) HYPERTENSION: Chronic | Status: ACTIVE | Noted: 2021-09-29

## 2021-10-13 PROBLEM — G47.30 SLEEP APNEA, UNSPECIFIED: Chronic | Status: ACTIVE | Noted: 2021-09-29

## 2021-10-13 PROBLEM — J45.909 UNSPECIFIED ASTHMA, UNCOMPLICATED: Chronic | Status: ACTIVE | Noted: 2021-09-29

## 2021-10-13 PROBLEM — Z86.79 PERSONAL HISTORY OF OTHER DISEASES OF THE CIRCULATORY SYSTEM: Chronic | Status: ACTIVE | Noted: 2021-09-29

## 2021-10-13 PROBLEM — C61 MALIGNANT NEOPLASM OF PROSTATE: Chronic | Status: ACTIVE | Noted: 2021-09-29

## 2021-10-13 PROBLEM — N20.0 CALCULUS OF KIDNEY: Chronic | Status: ACTIVE | Noted: 2021-09-29

## 2021-10-13 PROBLEM — C44.92 SQUAMOUS CELL CARCINOMA OF SKIN, UNSPECIFIED: Chronic | Status: ACTIVE | Noted: 2021-09-29

## 2021-10-13 PROBLEM — I48.91 UNSPECIFIED ATRIAL FIBRILLATION: Chronic | Status: ACTIVE | Noted: 2021-09-29

## 2021-10-13 PROBLEM — E78.5 HYPERLIPIDEMIA, UNSPECIFIED: Chronic | Status: ACTIVE | Noted: 2021-09-29

## 2021-10-13 PROBLEM — C44.91 BASAL CELL CARCINOMA OF SKIN, UNSPECIFIED: Chronic | Status: ACTIVE | Noted: 2021-09-29

## 2021-10-15 ENCOUNTER — NON-APPOINTMENT (OUTPATIENT)
Age: 71
End: 2021-10-15

## 2021-10-15 LAB — SURGICAL PATHOLOGY STUDY: SIGNIFICANT CHANGE UP

## 2021-10-18 ENCOUNTER — TRANSCRIPTION ENCOUNTER (OUTPATIENT)
Age: 71
End: 2021-10-18

## 2021-10-18 ENCOUNTER — NON-APPOINTMENT (OUTPATIENT)
Age: 71
End: 2021-10-18

## 2021-10-19 ENCOUNTER — APPOINTMENT (OUTPATIENT)
Dept: UROLOGY | Facility: CLINIC | Age: 71
End: 2021-10-19
Payer: MEDICARE

## 2021-10-19 DIAGNOSIS — N39.3 STRESS INCONTINENCE (FEMALE) (MALE): ICD-10-CM

## 2021-10-19 PROCEDURE — 99024 POSTOP FOLLOW-UP VISIT: CPT

## 2021-11-09 ENCOUNTER — NON-APPOINTMENT (OUTPATIENT)
Age: 71
End: 2021-11-09

## 2021-11-12 ENCOUNTER — APPOINTMENT (OUTPATIENT)
Dept: UROLOGY | Facility: CLINIC | Age: 71
End: 2021-11-12
Payer: MEDICARE

## 2021-11-12 VITALS
TEMPERATURE: 98 F | HEIGHT: 74 IN | SYSTOLIC BLOOD PRESSURE: 114 MMHG | BODY MASS INDEX: 25.54 KG/M2 | HEART RATE: 73 BPM | DIASTOLIC BLOOD PRESSURE: 70 MMHG | WEIGHT: 199 LBS

## 2021-11-12 DIAGNOSIS — C61 MALIGNANT NEOPLASM OF PROSTATE: ICD-10-CM

## 2021-11-12 PROCEDURE — 99024 POSTOP FOLLOW-UP VISIT: CPT

## 2021-11-13 NOTE — HISTORY OF PRESENT ILLNESS
[FreeTextEntry1] : John Liu returns to the office today. He is a 71-year-old man here today in follow-up after undergoing robotic assisted radical prostatectomy approximately 1 month ago. He is here today because he had noticed a bulge adjacent to one of his surgical incision sites from the robotic surgery. He became concerned about the possibility of a hernia. He had communicated to me yesterday about this and I have explained to him that this was most likely related to induration around one of the deep fascial sutures placed at surgery given its location and his description. He decided to come in today to have me have a look at it to ensure that there was no evidence of any significant abnormality.\par \par He reports that his urinary control is already quite good. He is using pads but they are getting damp and he is only using 1 or 2/day. He is doing physical therapy for the pelvic floor.

## 2021-11-13 NOTE — ASSESSMENT
[FreeTextEntry1] : Examination today shows that he does have induration adjacent to the left subcostal trocar site which does require a deep fascial suture for closure. The location of the induration is consistent with this. I do not feel any hernia. I have examined him both in supine and upright positions and also with Valsalva.\par \par I reassured him that his exam findings are typical after surgery and I do not have any significant concerns. He will be following up for a PSA level in a few weeks and I will plan to discuss that with him by phone as he will do it locally in [place] where he lives.

## 2022-10-29 NOTE — H&P PST ADULT - CORNEAL ABRASION RISK
Called [facility]. GIOVANNI Thompson  will be on his way to [facility] in 5 minutes   lubricating drops/daily eye drops

## 2024-05-03 NOTE — DISCHARGE NOTE NURSING/CASE MANAGEMENT/SOCIAL WORK - PATIENT PORTAL LINK FT
Script sent during OV.    You can access the FollowMyHealth Patient Portal offered by [facility] by registering at the following website: [email or web address]. By joining [employer]’s FollowMyHealth portal, you will also be able to view your health information using other applications (apps) compatible with our system.